# Patient Record
Sex: FEMALE | URBAN - METROPOLITAN AREA
[De-identification: names, ages, dates, MRNs, and addresses within clinical notes are randomized per-mention and may not be internally consistent; named-entity substitution may affect disease eponyms.]

---

## 2024-04-26 ENCOUNTER — APPOINTMENT (RX ONLY)
Dept: URBAN - METROPOLITAN AREA CLINIC 155 | Facility: CLINIC | Age: 24
Setting detail: DERMATOLOGY
End: 2024-04-26

## 2024-04-26 DIAGNOSIS — Z41.9 ENCOUNTER FOR PROCEDURE FOR PURPOSES OTHER THAN REMEDYING HEALTH STATE, UNSPECIFIED: ICD-10-CM

## 2024-04-26 PROCEDURE — ? FACIAL

## 2024-04-26 ASSESSMENT — LOCATION DETAILED DESCRIPTION DERM: LOCATION DETAILED: LEFT INFERIOR MEDIAL FOREHEAD

## 2024-04-26 ASSESSMENT — LOCATION SIMPLE DESCRIPTION DERM: LOCATION SIMPLE: LEFT FOREHEAD

## 2024-04-26 ASSESSMENT — LOCATION ZONE DERM: LOCATION ZONE: FACE

## 2024-04-26 NOTE — PROCEDURE: FACIAL
Price (Use Numbers Only, No Special Characters Or $): 125
Comments (Non-Sticky): She is currently using all ZO but still breaking out. She has more of their products at home and doesn’t know what they are so she will reach out and we will correct her skin care routine. She has been sick and having a breakout. If it’s not under control in a couple of weeks, she would like to see [facility] for possible antibiotic.
Treatment Type Override: anti aging facial
Treatment Type (Optional): Acne Facial
Exfoliation Type: pumpkin
Facial Steaming: steamed
Detail Level: Zone
Mask Type (Optional): calming
Extraction Method: 11 blade and q-tip
Treatment Serum Override: hydrating serum